# Patient Record
Sex: FEMALE | Race: WHITE | Employment: PART TIME | URBAN - METROPOLITAN AREA
[De-identification: names, ages, dates, MRNs, and addresses within clinical notes are randomized per-mention and may not be internally consistent; named-entity substitution may affect disease eponyms.]

---

## 2018-10-24 NOTE — PROGRESS NOTES
PHYSICAL THERAPY - DAILY TREATMENT NOTE    Patient Name: Nolvia Allison        Date: 10/25/2018  : 1955   YES Patient  Verified  Visit #:     Insurance: Payor: Zakiya Amezcua / Plan:  [address] [facility] / Product Type: Commerical /      In time: 7:42 am Out time: 8:41am   Total Treatment Time: 61     BCBS and Medicare Time Tracking (below)   Total Timed Codes (min):  n/a 1:1 Treatment Time:  n/a     TREATMENT AREA =  Low back pain [M54.5]  SUBJECTIVE  Pain Level (on 0 to 10 scale):  1-2  / 10   Medication Changes/New allergies or changes in medical history, any new surgeries or procedures? NO    If yes, update Summary List   Subjective Functional Status/Changes:  []  No changes reported     See POC       OBJECTIVE    10 min Therapeutic Exercise:  [x]  See flow sheet   [x]  Other:   Education anatomy and physiology of the lumbar spine. [x]  Added:  RFIS - incr  R L/S sx - recommended trail pre and post amb to mail box, education for D/C if RFIS cont incr L/S sx or peripheralize to DACIA LEs, however, cont of RFIS if incr amb tolerance    to improve (function):    []  Changed:     Rationale: To increase ROM, flexibility, and increase strength to improve the patients ability to tolerate prolonged ambulation      min Patient Education:  YES  Reviewed HEP   []  Progressed/Changed HEP based on: Other Objective/Functional Measures:   Physical Therapy Evaluation - Lumbar Spine ([facility])    SUBJECTIVE  Aggravated by:   [] Bending [] Sitting [x] Standing [x] Walking   [] Moving [] Cough [] Sneeze [] Valsalva   [] AM  [] PM  Lying:  [] sup   [] pro   [] sidelying     Eased by:    [] Bending [x] Sitting [] Standing [] Walking   [] Moving [] AM  [] PM  Lying: [] sup  [] pro  [] sidelying   [] Other:     General Health:  Red Flags Indicated? [x] Yes    [] No  [] Yes [x] No Recent weight change (If yes, due to dieting?  [] Yes  [x] No)   [x] Yes [] No Weakness in legs during walking - occassional with prolonged ambulation   [] Yes [x] No Unremitting pain at night  [] Yes [x] No Abdominal pain or problems  [] Yes [x] No Rectal bleeding  [] Yes [x] No Blood or pain with urination  [x] Yes [] No Dysfunction of bowel or bladder - constipation secondary to change in HTN medication   [] Yes [x] No Numbness/tingling in buttock/genitalia region   [] Yes [x] No   Fever or Chills   [] Yes [x] No   Ataxic Gait    Past History/Treatments:     Diagnostic Tests: [] Lab work [] X-rays    [] CT [x] MRI     [] Other:  Results: L/S stenosis    OBJECTIVE  Posture:  Lateral Shift: [] R    [] L     [] +  [x] -  Kyphosis: [] Increased [] Decreased   [x]  WNL  Lordosis:  [] Increased [x] Decreased   [] WNL    Gait:  [] Normal     [x] Abnormal:    Active Movements: [] N/A   [] Too acute   [] Other:  ROM % AROM % PROM Comments:pain, area   Forward flexion 40-60 85% Lower tibia Central L/S pull   Extension 20-30 50%     SB right 20-30 85%     SB left 20-30 85%  L buttock cramping, L L/S pain with ascending    Rotation right 5-10 100%     Rotation left 5-10 100%  L L/S pain   Side glide right 75%     Side glide left 85%  L L/S pain      Neuro Screen [x] WNL Dermatome: L2-S2 DACIA LE light touch sensation WNLs  R/L Patellar DTR (L4): 1+/1+   R/L Achilles DTR (S1): 0/0    Palpation  [x] Min  [] Mod  [] Severe    Location: DACIA R>L L/S Paraspinals    [x]WNL Location: Quadratus Lumborum  [x] Min  [] Mod  [] Severe    Location: DACIA R>LPiriformis    Strength   L(0-5) R (0-5) N/T   Psoas  (L1,2) 4+ 4+    []   Quadriceps (L3,4) 5- 5 []   Ant Tibialis (L4) 4+ 5 []   Extensor Hallicus (L5) 5- 4-    Gluteus Medius (L5) 3 2+ []   Gastrocnemius (S1, S2) 2 2 []   Hamstring (S1,2) 5- 5- []   Gluteus Holland (S1, S2) 4- 3+ []   Supine Bridge 25%  []     Special Tests    [place]:  Distraction: [] R    [] L    [] +    [x] -     Compression: [] +    [x] -      Thigh Thrust: [] +    [x] -                            Sacral Thrust: [] +    [x] -      Leg Length: [x] +    [] -   Position: (+R ANT L POST)     Mobility: Standing flex: (=)     Supine to sit: (N/T)         Hip: [place]:  [] R    [] L    [] +    [x] -     Piriformis: [] R    [] L    [] +    [x] -          Deficits: Ramses's: [] R    [] L    [] +    [x] -     Hamstrings 90/90: R170/L167 deg    Dural Mobility:  SLR Supine: [] R    [] L    [] +    [x] -  @ (degrees):   Slump Test: [] R    [] L    [] +    [x] -  @ (degrees):     Justification for Eval Complexity:   Patient History: HIGH Complexity :3+ comorbidities / personal factors will impact the outcome/ POC  (See POC for list of comorbidities)  Examination:HIGH Complexity : 4+ Standardized tests and measures addressing body structure, function, activity limitation and / or participation in recreation  (See POC and musculoskeletal examination attached)  Clinical Presentation: MEDIUM Complexity : Evolving with changing characteristics  (pain level 1-2/10 on average and 6/10 @ worst, DACIA LE pain, intermittent pain)  Clinical Decision Making:MEDIUM Complexity : FOTO score of 26-74 (Foto 45/100)  Overall Complexity:MEDIUM     Post Treatment Pain Level (on 0 to 10) scale:   1  / 10     ASSESSMENT  Assessment/Changes in Function:     See POC     []  See Progress Note/Recertification   Patient will continue to benefit from skilled PT services to modify and progress therapeutic interventions, address functional mobility deficits, address ROM deficits, address strength deficits, analyze and address soft tissue restrictions, analyze and cue movement patterns, analyze and modify body mechanics/ergonomics and assess and modify postural abnormalities to attain remaining goals.    Progress toward goals / Updated goals:    See POC     PLAN  [x]  Upgrade activities as tolerated YES Continue plan of care   []  Discharge due to :    []  Other:      Therapist: Cristine Galvan DPT     Date: 10/25/2018 Time: 3:25 PM     Future Appointments   Date Time Provider Liliana Parks 10/25/2018  7:30 AM Evangelina [facility]

## 2018-10-25 ENCOUNTER — HOSPITAL ENCOUNTER (OUTPATIENT)
Dept: PHYSICAL THERAPY | Age: 63
Discharge: HOME | End: 2018-10-25
Payer: COMMERCIAL

## 2018-10-25 PROCEDURE — 97110 THERAPEUTIC EXERCISES: CPT

## 2018-10-25 PROCEDURE — 97162 PT EVAL MOD COMPLEX 30 MIN: CPT

## 2018-10-31 ENCOUNTER — HOSPITAL ENCOUNTER (OUTPATIENT)
Dept: PHYSICAL THERAPY | Age: 63
Discharge: HOME | End: 2018-10-31
Payer: COMMERCIAL

## 2018-10-31 PROCEDURE — 97140 MANUAL THERAPY 1/> REGIONS: CPT

## 2018-10-31 PROCEDURE — 97110 THERAPEUTIC EXERCISES: CPT

## 2018-10-31 NOTE — PROGRESS NOTES
PHYSICAL THERAPY - DAILY TREATMENT NOTE    Patient Name: Jose Angel Freedman        Date: 10/31/2018  : 1955   YES Patient  Verified  Visit #:     Insurance: Payor: Dilip Dos Santos / Plan:  [address] [facility] / Product Type: Commerical /      In time: 7:36 am Out time: 8:34am   Total Treatment Time: 62     BCBS and Medicare Time Tracking (below)   Total Timed Codes (min):  n/a 1:1 Treatment Time:  N/a     TREATMENT AREA =  Spinal stenosis, lumbar region with neurogenic claudication [M48.062]  Low back pain [M54.5]  SUBJECTIVE  Pain Level (on 0 to 10 scale):  1.5  / 10   Medication Changes/New allergies or changes in medical history, any new surgeries or procedures? NO    If yes, update Summary List   Subjective Functional Status/Changes:  []  No changes reported     Pt states \"right above my baseline I feel a little pinch, but its only while im down and as soon as I'm back up its gone, the pain is at the belt line and feels like a pulling going up the back, improved the\"         OBJECTIVE    Modality Modalities Rationale: decrease inflammation and decrease pain to improve patient's ability to perform ADLs. min [] Estim, type:                                          []  att     []  unatt     []  w/US     []  w/ice    []  w/heat    min []  Mechanical Traction: type/lbs                                               []  pro   []  sup   []  int   []  cont    min []  Ultrasound, settings/location:      min []  Iontophoresis:  []  take home patch w/ dexamethazone    min                                []  in clinic w/ dexamethazone   5 min []  Ice     [x]  Heat     position: L/S supine with wedge    min []  Other:      40 min Therapeutic Exercise:  [x]  See flow sheet   []  Other:      [x]  Added:  RFIS, DKTC, PPT, and hamstring stretch   to improve (function):    []  Changed:     Rationale:  To increase ROM, flexibility, and increase strength to improve the patients ability to perform prolonged ambulation to grocery shop    13 min Manual Therapy: STM to DACIA lower T/S and upper L/S paraspinals, trigger point release to DACIA piriformis L>R   Rationale:      decrease pain, increase ROM, increase tissue extensibility and decrease trigger points in L/S to improve patient's ability to tolerate prolonged standing. min Patient Education:  YES  Reviewed HEP   []  Progressed/Changed HEP based on: Other Objective/Functional Measures: Added RFIS, DKTC, PPT, and hamstring stretch with good pt tolerance and no complaints of sx. Post Treatment Pain Level (on 0 to 10) scale:   0  / 10     ASSESSMENT  Assessment/Changes in Function:     Pt presented with increased TTP and mm tone in L piriformis and DACIA L/S and lower T/S musculature. VCing for proper form with therex t/o tx program. Updated and issued L/S stenosis HEP. []  See Progress Note/Recertification   Patient will continue to benefit from skilled PT services to modify and progress therapeutic interventions, address functional mobility deficits, address ROM deficits, address strength deficits, analyze and address soft tissue restrictions, analyze and cue movement patterns, analyze and modify body mechanics/ergonomics and assess and modify postural abnormalities to attain remaining goals. Progress toward goals / Updated goals:    First visit after initial evaluation. Progress tx per POC.         PLAN  [x]  Upgrade activities as tolerated YES Continue plan of care   []  Discharge due to :    []  Other:      Therapist: Juan Lima DPT     Date: 10/31/2018 Time: 7:04 AM     Future Appointments   Date Time Provider Liliana Parks   10/31/2018  7:30 AM [facility]   11/2/2018  3:30 PM Davidson Jain PTA [facility]   11/6/2018  4:30 PM Davidson Jain PTA [facility]   11/8/2018  4:30 PM Davidson Jain PTA [facility]   11/12/2018  4:30 PM [facility]   11/15/2018  4:30 PM Davidson Jain PTA [facility]   11/19/2018  4:30 PM [facility]   11/21/2018  4:30 PM [facility]   11/26/2018  4:30 PM [facility]   11/29/2018  4:30 PM Migue Caldera PTA [facility]   12/3/2018  4:30 PM [facility]

## 2018-11-02 ENCOUNTER — HOSPITAL ENCOUNTER (OUTPATIENT)
Dept: PHYSICAL THERAPY | Age: 63
Discharge: HOME | End: 2018-11-02
Payer: COMMERCIAL

## 2018-11-02 PROCEDURE — 97140 MANUAL THERAPY 1/> REGIONS: CPT

## 2018-11-02 PROCEDURE — 97110 THERAPEUTIC EXERCISES: CPT

## 2018-11-02 NOTE — PROGRESS NOTES
PHYSICAL THERAPY - DAILY TREATMENT NOTE    Patient Name: Edith Pagan        Date: 2018  : 1955   YES Patient  Verified  Visit #:   3   of   12  Insurance: Payor: Alanis Fuentes / [facility]:  [facility] / Product Type: Commerical /      In time: 3:25 pm Out time: 4:19 pm   Total Treatment Time: 54     Medicare Time Tracking (below)   Total Timed Codes (min):  n/a 1:1 Treatment Time:  n/a     TREATMENT AREA =  Spinal stenosis, lumbar region with neurogenic claudication [M48.062]  Low back pain [M54.5]    SUBJECTIVE  Pain Level (on 0 to 10 scale):  0  / 10-took 2 ibuprofin at 12pm   Medication Changes/New allergies or changes in medical history, any new surgeries or procedures? NO    If yes, update Summary List   Subjective Functional Status/Changes:  []  No changes reported     I have started doing the exercise and it seems like all the muscles and spine are resetting itself. Pt reports doing HEP pain in knees, upper thighs, upper back and in shoulder joints. Walking is bothering her.    Doing HEP 4.5 to 5x per day  Pt reports daily low grade HA      OBJECTIVE  Modalities Rationale:     decrease edema, decrease inflammation, decrease pain and increase tissue extensibility to improve patient's ability to perform prolonged standing , walking   min [] Estim, type/location:                                      []  att     []  unatt     []  w/US     []  w/ice    []  w/heat    min []  Mechanical Traction: type/lbs                   []  pro   []  sup   []  int   []  cont    []  before manual    []  after manual    min []  Ultrasound, settings/location:      min []  Iontophoresis w/ dexamethasone, location:                                               []  take home patch       []  in clinic   10 min []  Ice     [x]  Heat    location/position: Supine low back    min []  Vasopneumatic Device, press/temp:     min []  Other:    [x] Skin assessment post-treatment (if applicable):    [x]  intact    []  redness- no adverse reaction     []redness - adverse reaction:        34 min Therapeutic Exercise:  [x]  See flow sheet   Rationale:      increase ROM and increase strength to improve the patients ability to perform prolonged standing , walking      10 min Manual Therapy: Prone bilateral pirformis release L>R; STM/DTM to lumbar paraspinals, lower thoracic paraspinals   Rationale:      decrease pain, increase ROM, increase tissue extensibility and decrease trigger points to improve patient's ability to improve tissue mobility in ADLs       min Patient Education:  YES  Reviewed HEP   []  Progressed/Changed HEP based on: Other Objective/Functional Measures:    Hold DKTC- add SKTC, modified hamstring stretch to sitting     Post Treatment Pain Level (on 0 to 10) scale:   0*  / 10     ASSESSMENT  Assessment/Changes in Function:   Pt education to perform HEP within tolerable ranges. VCs for proper stretching technique in forward trunk flexion. Trigger point tenderness right > Left lumbar paraspoinals and left > Right piriformis. []  See Progress Note/Recertification   Patient will continue to benefit from skilled PT services to modify and progress therapeutic interventions, address functional mobility deficits, address ROM deficits, analyze and cue movement patterns, analyze and modify body mechanics/ergonomics and instruct in home and community integration to attain remaining goals. Progress toward goals / Updated goals: · Short Term Goals: To be accomplished in 3  weeks:  1) Establish HEP. -goal in progress  2) Patient will report decreased c/o pain to < or = 5/10 at the worst to facilitate prolonged standing with manageable sx in L/S.  3) Patient will report 25% improvement in ability to transfer groceries from the shopping cart into car.   4) Patient will report 25% improvement in DACIA LE radicular symptoms in order to facilitate ease with prolonged sitting.             PLAN  []  Upgrade activities as tolerated YES Continue plan of care   []  Discharge due to :    []  Other:      Therapist: Neyda Araiza PTA    Date: 11/2/2018 Time: 4: 19  PM     Future Appointments   Date Time Provider Liliana Parks   11/2/2018  3:30 PM Concepcion Lesch, PTA [facility]   11/6/2018  4:30 PM Concepcion Lesch, [facility]   11/8/2018  4:30 PM Concepcion Lesch, PTA [facility]   11/12/2018  4:30 PM Brook Copping [facility]   11/15/2018  4:30 PM Concepcion Lesch, [facility]   11/19/2018  4:30 PM Brook Copping [facility]   11/21/2018  4:30 PM Brook Copping [facility]   11/26/2018  4:30 PM Brook Copping [facility]   11/29/2018  4:30 PM Concepcion Lesch, [facility]   12/3/2018  4:30 PM Brook Copping [facility]

## 2018-11-05 ENCOUNTER — HOSPITAL ENCOUNTER (OUTPATIENT)
Dept: PHYSICAL THERAPY | Age: 63
Discharge: HOME | End: 2018-11-05
Payer: COMMERCIAL

## 2018-11-05 PROCEDURE — 97110 THERAPEUTIC EXERCISES: CPT

## 2018-11-05 PROCEDURE — 97140 MANUAL THERAPY 1/> REGIONS: CPT

## 2018-11-05 NOTE — PROGRESS NOTES
PHYSICAL THERAPY - DAILY TREATMENT NOTE    Patient Name: Reggie Smith        Date: 2018  : 1955   YES Patient  Verified  Visit #:     Insurance: Payor: Katharina Marte / [facility]:  [facility] [address] [facility] [address] [facility] [address] [facility] / Product Type: Commerical /      In time: 505 pm Out time: 611 pm   Total Treatment Time: 66     Medicare Time Tracking (below)   Total Timed Codes (min):  n/a 1:1 Treatment Time:  n/a     TREATMENT AREA =  Spinal stenosis, lumbar region with neurogenic claudication [M48.062]  Low back pain [M54.5]    SUBJECTIVE  Pain Level (on 0 to 10 scale): 1.5/10   Medication Changes/New allergies or changes in medical history, any new surgeries or procedures? NO    If yes, update Summary List   Subjective Functional Status/Changes:  []  No changes reported     I dont have any HA today and I feel like the core exercises are so important. Id like to work on increasing my walking tolerance as I can only walk < 25yards before my low back hurts and gets tired. Im a bit sore bc I was working at another desk today.      OBJECTIVE  Modalities Rationale:     decrease edema, decrease inflammation, decrease pain and increase tissue extensibility to improve patient's ability to perform prolonged standing , walking   min [] Estim, type/location:                                      []  att     []  unatt     []  w/US     []  w/ice    []  w/heat    min []  Mechanical Traction: type/lbs                   []  pro   []  sup   []  int   []  cont    []  before manual    []  after manual    min []  Ultrasound, settings/location:      min []  Iontophoresis w/ dexamethasone, location:                                               []  take home patch       []  in clinic   10 min []  Ice     [x]  Heat    location/position: Prone with LEs over pillow low back    min []  Vasopneumatic Device, press/temp:     min []  Other:    [x] Skin assessment post-treatment (if applicable):    [x]  intact    []  redness- no adverse reaction []redness - adverse reaction:        45 min Therapeutic Exercise:  [x]  See flow sheet   Rationale:      increase ROM and increase strength to improve the patients ability to perform prolonged standing , walking      11 min Manual Therapy: Sacral decompression and TPR left piriformis   Rationale:      decrease pain, increase ROM, increase tissue extensibility and decrease trigger points to improve patient's ability to improve tissue mobility in ADLs       min Patient Education:  YES  Reviewed HEP   []  Progressed/Changed HEP based on: Other Objective/Functional Measures:  + ASLR LLE 0-35 degrees   + BLE dependent rubor   Post Treatment Pain Level (on 0 to 10) scale:   1*  / 10     ASSESSMENT  Assessment/Changes in Function:   Pt education to perform HEP to tolerance. She was educated on importance of avoiding crossing the RLE over the LLE when sitting. Postural re-ed provided throughout session. She required manual cueing to assist with TA contraction for all core therex. Pt instructed to begin short daily bouts of ambulation and to increase frequency of such bouts instead of duration. She was instructed to time herself and get a baseline as to how long she can ambulate before she must sit down.     []  See Progress Note/Recertification   Patient will continue to benefit from skilled PT services to modify and progress therapeutic interventions, address functional mobility deficits, address ROM deficits, analyze and cue movement patterns, analyze and modify body mechanics/ergonomics and instruct in home and community integration to attain remaining goals. Progress toward goals / Updated goals: · Short Term Goals: To be accomplished in 3  weeks:  1) Establish HEP.   -goal in progress  2) Patient will report decreased c/o pain to < or = 5/10 at the worst to facilitate prolonged standing with manageable sx in L/S.-PROGRESSING 1.5/10 11/5/18  3) Patient will report 25% improvement in ability to transfer groceries from the shopping cart into car.   4) Patient will report 25% improvement in DACIA LE radicular symptoms in order to facilitate ease with prolonged sitting.             PLAN  []  Upgrade activities as tolerated YES Continue plan of care   []  Discharge due to :    []  Other:      Therapist: Mahsa Liriano PT    Date: 11/5/2018 Time: 4: 19  PM     Future Appointments   Date Time Provider Liliana Parks   11/8/2018  4:30 PM Cary Hameed [place] [id number]   11/12/2018  4:30 PM Deepika Phy DMCPTA [facility] [address]   11/15/2018  4:30 PM Cary Hameed [place] [id number]   11/19/2018  4:30 PM Deepika Phy DMCPTA [facility] [address]   11/21/2018  4:30 PM Deepika Phy DMCPTA [facility] [address]   11/26/2018  4:30 PM Deepika Phy DMCPTA [facility] [address]   11/29/2018  4:30 PM Cary Hameed [place] [id number]   12/3/2018  4:30 PM Deepika Phy [place] [id number]

## 2018-11-06 ENCOUNTER — APPOINTMENT (OUTPATIENT)
Dept: PHYSICAL THERAPY | Age: 63
End: 2018-11-06
Payer: COMMERCIAL

## 2018-11-08 ENCOUNTER — HOSPITAL ENCOUNTER (OUTPATIENT)
Dept: PHYSICAL THERAPY | Age: 63
Discharge: HOME | End: 2018-11-08
Payer: COMMERCIAL

## 2018-11-08 PROCEDURE — 97140 MANUAL THERAPY 1/> REGIONS: CPT

## 2018-11-08 PROCEDURE — 97110 THERAPEUTIC EXERCISES: CPT

## 2018-11-08 NOTE — PROGRESS NOTES
PHYSICAL THERAPY - DAILY TREATMENT NOTE    Patient Name: Damion Balbuena        Date: 2018  : 1955   YES Patient  Verified  Visit #:     Insurance: Payor: Jodie Vela / Plan:  [employer] [address] PT / Product Type: Commerical /      In time: 4:35 pm Out time: 5:42 pm   Total Treatment Time: 79     Medicare Time Tracking (below)   Total Timed Codes (min):  n/a 1:1 Treatment Time:  n/a     TREATMENT AREA =  Spinal stenosis, lumbar region with neurogenic claudication [M48.062]  Low back pain [M54.5]    SUBJECTIVE  Pain Level (on 0 to 10 scale):  0 / 10   Medication Changes/New allergies or changes in medical history, any new surgeries or procedures? NO    If yes, update Summary List   Subjective Functional Status/Changes:  []  No changes reported     Pt states doing a lot of walking on Tuesday secondary to voting. I went through a wobbly stage when I went threw the door. I walked about 2.5 min until I got to the door. After voting she had to stop twice to stretch to get back to the car. Yesterday an all over feeling of not feeling well. Today she feels better.         OBJECTIVE  Modalities Rationale:     decrease edema, decrease inflammation, decrease pain and increase tissue extensibility to improve patient's ability to perform prolonged walking   min [] Estim, type/location:                                      []  att     []  unatt     []  w/US     []  w/ice    []  w/heat    min []  Mechanical Traction: type/lbs                   []  pro   []  sup   []  int   []  cont    []  before manual    []  after manual    min []  Ultrasound, settings/location:      min []  Iontophoresis w/ dexamethasone, location:                                               []  take home patch       []  in clinic   10 min []  Ice     [x]  Heat    location/position: Supine LE elevated    min []  Vasopneumatic Device, press/temp:     min []  Other:    [x] Skin assessment post-treatment (if applicable):    [x]  intact []  redness- no adverse reaction     []redness - adverse reaction:        47 min Therapeutic Exercise:  [x]  See flow sheet   Rationale:      increase ROM and increase strength to improve the patients ability to perform prolonged walking     10 min Manual Therapy: Prone bilateral pirformis release L>R; STM/DTM to lumbar paraspinals, lower thoracic paraspinals   Rationale:      decrease pain, increase ROM, increase tissue extensibility and decrease trigger points to improve patient's ability to improve tissue mobility in ADLs       min Patient Education:  YES  Reviewed HEP   []  Progressed/Changed HEP based on: Other Objective/Functional Measures:  Advanced gentle core stabilization: add supine march with abdominal draw  Review HEP. Post Treatment Pain Level (on 0 to 10) scale:  0  / 10     ASSESSMENT  Assessment/Changes in Function:   Modified supine posterior hip stretch to 90/90 with good tolerance. Pt demonstrating improving AROM and tolerance to stretching with SKTC and decreased ROM in trunk FF stretch. Reviewed the importance of short frequent walks versus prolonged walking and stretching within tolerable ranges in HEP. Pt education in use of moist heat in supine decompression position and use of flexion stretches for pain management in activities. Pt demonstrating improving mobility in position changes from supine to sit and bed mobility. []  See Progress Note/Recertification   Patient will continue to benefit from skilled PT services to modify and progress therapeutic interventions, address functional mobility deficits, address ROM deficits, address strength deficits, analyze and address soft tissue restrictions, analyze and cue movement patterns, analyze and modify body mechanics/ergonomics and instruct in home and community integration to attain remaining goals.    Progress toward goals / Updated goals:  1) Establish HEP.  -goal in progress  2) Patient will report decreased c/o pain to < or = 5/10 at the worst to facilitate prolonged standing with manageable sx in L/S.-PROGRESSING 1.5/10 11/5/18  3) Patient will report 25% improvement in ability to transfer groceries from the shopping cart into car.   4) Patient will report 25% improvement in DACIA LE radicular symptoms in order to facilitate ease with prolonged sitting- goal in progress     PLAN  []  Upgrade activities as tolerated YES Continue plan of care   []  Discharge due to :    []  Other:      Therapist: Jolynn Lopez PTA    Date: 11/8/2018 Time: 5:42  PM     Future Appointments   Date Time Provider Liliana Parks   11/12/2018  4:30 PM Prasad Mcbride [facility]   11/15/2018  4:30 PM Leo Musa PTA [facility]   11/19/2018  4:30 PM Prasad [facility]   11/21/2018  4:30 PM [facility]   11/26/2018  4:30 PM Prasad [facility]   11/29/2018  4:30 PM Leo Musa PTA [facility]   12/3/2018  4:30 PM Alfredo Leggett

## 2018-11-12 ENCOUNTER — HOSPITAL ENCOUNTER (OUTPATIENT)
Dept: PHYSICAL THERAPY | Age: 63
Discharge: HOME | End: 2018-11-12
Payer: COMMERCIAL

## 2018-11-12 PROCEDURE — 97110 THERAPEUTIC EXERCISES: CPT

## 2018-11-12 PROCEDURE — 97140 MANUAL THERAPY 1/> REGIONS: CPT

## 2018-11-12 NOTE — PROGRESS NOTES
PHYSICAL THERAPY - DAILY TREATMENT NOTE    Patient Name: Jolanta Wren        Date: 2018  : 1955   YES Patient  Verified  Visit #:     Insurance: Payor: Gaston Holder / Plan: [address] PT / Product Type: Commerical /      In time: 4:18pm Out time: 5:32pm   Total Treatment Time: 74     Medicare Time Tracking (below)   Total Timed Codes (min):  n/a 1:1 Treatment Time:  n/a     TREATMENT AREA =  Spinal stenosis, lumbar region with neurogenic claudication [M48.062]  Low back pain [M54.5]    SUBJECTIVE  Pain Level (on 0 to 10 scale):  1  / 10   Medication Changes/New allergies or changes in medical history, any new surgeries or procedures? NO    If yes, update Summary List   Subjective Functional Status/Changes:  []  No changes reported     If I sit too long it gets stiff, I was off today so its sore, I did a lot of yard work. OBJECTIVE  Modalities Rationale:     decrease inflammation and decrease pain to improve patient's ability to perform ADLs. min [] Estim, type/location:                                      []  att     []  unatt     []  w/US     []  w/ice    []  w/heat    min []  Mechanical Traction: type/lbs                   []  pro   []  sup   []  int   []  cont    []  before manual    []  after manual    min []  Ultrasound, settings/location:      min []  Iontophoresis w/ dexamethasone, location:                                               []  take home patch       []  in clinic   10 min []  Ice     [x]  Heat    location/position: L/S    min []  Vasopneumatic Device, press/temp:     min []  Other:    [x] Skin assessment post-treatment (if applicable):    [x]  intact    []  redness- no adverse reaction     []redness - adverse reaction:        53 min Therapeutic Exercise:  [x]  See flow sheet   Rationale:      increase ROM and increase strength to improve the patients ability to perform household chores.       11 min Manual Therapy: STM to DACIA lower T/S and upper L/S paraspinals, trigger point release to DACIA piriformis L>R   Rationale:      decrease pain, increase ROM, increase tissue extensibility and decrease trigger points to improve patient's ability to tolerate prolonged standing. min Patient Education:  YES  Reviewed HEP   []  Progressed/Changed HEP based on: Other Objective/Functional Measures: Added TFL stretch     Post Treatment Pain Level (on 0 to 10) scale:   0  / 10     ASSESSMENT  Assessment/Changes in Function:     Progressed DACIA LE flexibility by progressing piriformis to figure 4 stretch and adding TFL stretch. Demonstrated increased DACIA piriformis tone and tenderness. Progressed therex as appropriate. []  See Progress Note/Recertification   Patient will continue to benefit from skilled PT services to modify and progress therapeutic interventions, address functional mobility deficits, address ROM deficits, address strength deficits, analyze and address soft tissue restrictions, analyze and cue movement patterns, analyze and modify body mechanics/ergonomics, assess and modify postural abnormalities, address imbalance/dizziness and instruct in home and community integration to attain remaining goals. Progress toward goals / Updated goals:    Progressing towards STGs 1-4.   1) Establish HEP. 2) Patient will report decreased c/o pain to < or = 5/10 at the worst to facilitate prolonged standing with manageable sx in L/S. Goal in progress - 1/10 pain at the worst  3) Patient will report 25% improvement in ability to transfer groceries from the shopping cart into car. Goal Met - >/=25% \"It went ok I had to be real careful, it immediately went away when I sat down, it is still an activity I have to be careful with because it will pull\"  4) Patient will report 25% improvement in DACIA LE radicular symptoms in order to facilitate ease with prolonged sitting.         PLAN  [x]  Upgrade activities as tolerated YES Continue plan of care   [] Discharge due to :    []  Other:      Therapist: aMtt Chapman    Date: 11/12/2018 Time: 2:56 PM     Future Appointments   Date Time Provider Liliana Parks   11/12/2018  4:30 PM Leo [facility]   11/15/2018  4:30 PM Peggy Bender PTA [facility]   11/19/2018  4:30 PM Leo [facility]   11/21/2018  4:30 PM Leo [facility]   11/26/2018  4:30 PM Leo [facility]   11/29/2018  4:30 PM Peggy Bender PTA [facility]   12/3/2018  4:30 PM Leo [facility]

## 2018-11-15 ENCOUNTER — HOSPITAL ENCOUNTER (OUTPATIENT)
Dept: PHYSICAL THERAPY | Age: 63
Discharge: HOME | End: 2018-11-15
Payer: COMMERCIAL

## 2018-11-15 PROCEDURE — 97140 MANUAL THERAPY 1/> REGIONS: CPT

## 2018-11-15 PROCEDURE — 97110 THERAPEUTIC EXERCISES: CPT

## 2018-11-15 NOTE — PROGRESS NOTES
PHYSICAL THERAPY - DAILY TREATMENT NOTE    Patient Name: Lavonne Obrien        Date: 11/15/2018  : 1955   YES Patient  Verified  Visit #:   Insurance: Payor: Fran Porter / Plan:  [address] [facility] / Product Type: Commerical /      In time: 4:17pm Out time: 5:25 pm   Total Treatment Time: 68     Medicare Time Tracking (below)   Total Timed Codes (min):  n/a 1:1 Treatment Time:  n/a     TREATMENT AREA =  Spinal stenosis, lumbar region with neurogenic claudication [M48.062]  Low back pain [M54.5]    SUBJECTIVE  Pain Level (on 0 to 10 scale): 0  / 10   Medication Changes/New allergies or changes in medical history, any new surgeries or procedures? NO    If yes, update Summary List   Subjective Functional Status/Changes:  []  No changes reported     Pt states \"I had a meeting at the scope yesterday. I had to stop times to stretch. I had to park at one end and walk all the way up . Other than that the exercises are serving me very well. I'm feeling a difference. I'm beginning to feel a firmness in my core and a lightness in my step as long as I don't over do it. \"         OBJECTIVE  Modalities Rationale:     decrease inflammation and decrease pain to improve patient's ability to perform ADLs.      min [] Estim, type/location:                                      []  att     []  unatt     []  w/US     []  w/ice    []  w/heat    min []  Mechanical Traction: type/lbs                   []  pro   []  sup   []  int   []  cont    []  before manual    []  after manual    min []  Ultrasound, settings/location:      min []  Iontophoresis w/ dexamethasone, location:                                               []  take home patch       []  in clinic   10 min []  Ice     [x]  Heat    location/position: L/S    min []  Vasopneumatic Device, press/temp:     min []  Other:    [x] Skin assessment post-treatment (if applicable):    [x]  intact    []  redness- no adverse reaction     []redness - adverse reaction: 48 min Therapeutic Exercise:  [x]  See flow sheet (33 min billed)    Rationale:      increase ROM and increase strength to improve the patients ability to perform household chores. 10 min Manual Therapy: STM to DACIA lower T/S and upper L/S paraspinals, trigger point release to DACIA piriformis L>R   Rationale:      decrease pain, increase ROM, increase tissue extensibility and decrease trigger points to improve patient's ability to tolerate prolonged standing. min Patient Education:  YES  Reviewed HEP   []  Progressed/Changed HEP based on: Other Objective/Functional Measures:    No exercise progression secondary to pt challenged by current program.    Post Treatment Pain Level (on 0 to 10) scale:   0  / 10     ASSESSMENT  Assessment/Changes in Function:   Pt demonstrating improving lumbar AROM in  flexion . Pt met STG #1 for HEP and partially met STG #4 for decreasing radicular symptoms. Increased mm tone lower lumbar paraspinals. []  See Progress Note/Recertification   Patient will continue to benefit from skilled PT services to modify and progress therapeutic interventions, address functional mobility deficits, address ROM deficits, address strength deficits, analyze and address soft tissue restrictions, analyze and cue movement patterns, analyze and modify body mechanics/ergonomics, assess and modify postural abnormalities, address imbalance/dizziness and instruct in home and community integration to attain remaining goals. Progress toward goals / Updated goals:    Progressing towards STGs 1-4.   1) Establish HEP. -patient met goal    2) Patient will report decreased c/o pain to < or = 5/10 at the worst to facilitate prolonged standing with manageable sx in L/S. Goal in progress - 1/10 pain at the worst  3) Patient will report 25% improvement in ability to transfer groceries from the shopping cart into car.  Goal Met - >/=25%  4) Patient will report 25% improvement in DACIA LE radicular symptoms in order to facilitate ease with prolonged sitting. - no radicular symptoms x 3-4 days.        PLAN  [x]  Upgrade activities as tolerated YES Continue plan of care   []  Discharge due to :    []  Other:      Therapist: Annabella Veloz PTA    Date: 11/15/2018 Time: 5:25  PM     Future Appointments   Date Time Provider Liliana Parks   11/15/2018  4:30 PM Dionicio Bowen PTA [facility]   11/19/2018  4:30 PM [facility]   11/21/2018  4:30 PM [facility]   11/26/2018  4:30 PM [facility]   11/29/2018  4:30 PM Dionicio Bowen PTA [facility]   12/3/2018  4:30 PM [facility]

## 2018-11-19 ENCOUNTER — HOSPITAL ENCOUNTER (OUTPATIENT)
Dept: PHYSICAL THERAPY | Age: 63
Discharge: HOME | End: 2018-11-19
Payer: COMMERCIAL

## 2018-11-19 PROCEDURE — 97140 MANUAL THERAPY 1/> REGIONS: CPT

## 2018-11-19 PROCEDURE — 97110 THERAPEUTIC EXERCISES: CPT

## 2018-11-19 NOTE — PROGRESS NOTES
PHYSICAL THERAPY - DAILY TREATMENT NOTE    Patient Name: Rica Aguiar        Date: 2018  : 1955   YES Patient  Verified  Visit #:   8     Insurance: Payor: Samuel Tena / Plan:  [email or web address] PT / Product Type: Commerical /      In time: 4:25pm Out time: 5:45pm   Total Treatment Time: [de-identified]     BCBS and Medicare Time Tracking (below)   Total Timed Codes (min):  n/a 1:1 Treatment Time:  n/a     TREATMENT AREA =  Spinal stenosis, lumbar region with neurogenic claudication [M48.062]  Low back pain [M54.5]  SUBJECTIVE  Pain Level (on 0 to 10 scale):  0  / 10   Medication Changes/New allergies or changes in medical history, any new surgeries or procedures? NO    If yes, update Summary List   Subjective Functional Status/Changes:  []  No changes reported     Pt states \"I did a little vacuming and im stiff today\"         OBJECTIVE    Modality Modalities Rationale: decrease pain and increase tissue extensibility to improve patient's ability to perform ADLs. min [] Estim, type:                                          []  att     []  unatt     []  w/US     []  w/ice    []  w/heat    min []  Mechanical Traction: type/lbs                                               []  pro   []  sup   []  int   []  cont    min []  Ultrasound, settings/location:      min []  Iontophoresis:  []  take home patch w/ dexamethazone    min                                []  in clinic w/ dexamethazone   10 min []  Ice     [x]  Heat     position: L/S in supine     min []  Other:      60 min Therapeutic Exercise:  [x]  See flow sheet   []  Other:      [x]  Added:  clam   to improve (function):    []  Changed:     Rationale:  To increase ROM, flexibility, and increase strength to improve the patients ability to perform household chores    10 min Manual Therapy: Prone STM to DACIA l/S paraspinals, L QL, trigger point release to L>R piriformis   Rationale:      decrease pain, increase ROM, increase tissue extensibility and decrease trigger points in L/S to improve patient's ability to tolerate prolonged standing. min Patient Education:  YES  Reviewed HEP   []  Progressed/Changed HEP based on: Other Objective/Functional Measures: Added clam with good pt tolerance and no complaints of sx. Post Treatment Pain Level (on 0 to 10) scale:   0  / 10     ASSESSMENT  Assessment/Changes in Function:     Pt presented with increased TTP and mm tone in Left piriformis and DACIA L/S paraspinals. Progressed hip strengthening as appropriate. Improved independence with tx program.      []  See Progress Note/Recertification   Patient will continue to benefit from skilled PT services to modify and progress therapeutic interventions, address functional mobility deficits, address ROM deficits, address strength deficits, analyze and address soft tissue restrictions, analyze and cue movement patterns, analyze and modify body mechanics/ergonomics and assess and modify postural abnormalities to attain remaining goals. Progress toward goals / Updated goals:    Progressing towards all STGs.  PN due NV     PLAN  [x]  Upgrade activities as tolerated YES Continue plan of care   []  Discharge due to :    []  Other:      Therapist: Vianey Posadas DPT     Date: 11/19/2018 Time: 4:25 PM     Future Appointments   Date Time Provider Liliana Parks   11/19/2018  4:30 PM Erick Al [facility]   11/26/2018  4:30 PM Erick [facility]   11/29/2018  4:30 PM Lynsey Tejada PTA [facility]   12/3/2018  4:30 PM Erick Al [facility]

## 2018-11-21 ENCOUNTER — APPOINTMENT (OUTPATIENT)
Dept: PHYSICAL THERAPY | Age: 63
End: 2018-11-21
Payer: COMMERCIAL

## 2018-11-26 ENCOUNTER — HOSPITAL ENCOUNTER (OUTPATIENT)
Dept: PHYSICAL THERAPY | Age: 63
Discharge: HOME | End: 2018-11-26
Payer: COMMERCIAL

## 2018-11-26 PROCEDURE — 97140 MANUAL THERAPY 1/> REGIONS: CPT

## 2018-11-26 PROCEDURE — 97110 THERAPEUTIC EXERCISES: CPT

## 2018-11-26 NOTE — PROGRESS NOTES
[address] [facility] THERAPY  [address] -   Phone: [phone]  Fax: [phone]  [x]  PROGRESS NOTE  []   [facility] SUMMARY  Patient Name: Susan Khan : 1955   Treatment Diagnosis: Spinal stenosis, lumbar region with neurogenic claudication [M48.062]  Low back pain [M54.5]     Referral Source: Ace Riddle DO     Date of Initial Visit: 10/25/2018 Attended Visits: 9 Missed Visits: 1     SUMMARY OF TREATMENT  Therapeutic exercises including ROM, strengthening, stretching, manual therapy including joint and soft tissue manipulation, core strengthening, postural re-education, modalities: MHP, and HEP instruction. CURRENT STATUS  The pt has progressed well with therapy, consistently reporting decreased pain and increased functional ability. Currently, the patient's main complaint is LBP with prolonged standing and ambulation. Currently, pt reports 25% improvement in back sx since IE. Average lumbar pain is rated at <1/10 and 1/10 at the worst. Trunk AROM is as follows: flexion 100%, extension 25%, R/L Side bending 100%/100%, and R/L Rotation 95%/100%. Pt would benefit from continued PT services in order to improve L/S AROM, core strength, LE strength, flexibility, functional mobility and address remaining impairments. Goal/Measure of Progress Goal Met? 1.  Establish HEP to prevent further disability. Status at last Eval: Goal Established Current Status: I with HEP yes   2. Patient will report decreased c/o pain to < or = 5/10 at the worst to facilitate prolonged standing with manageable sx in L/S. Status at last Eval: 6/10 at the worst Current Status: 1/10 at the worst yes   3. Patient will report 25% improvement in ability to transfer groceries from the shopping cart into car. Status at last Eval: Goal Established Current Status: L/S pain with lifting and ambulation with groceries  Progressing    4.   Patient will report 25% improvement in DACIA LE radicular symptoms in order to facilitate ease with prolonged sitting. Status at last Eval: DACIA sides of the L/S and radiates down DACIA LEs (below the knee) and is described as an intermittent ache Current Status: >/=50% improvement in DACIA post LE pain yes     New Goals to be achieved in __3-4__  Weeks:  1. Patient independent with HEP and able to demo proper body mechanics for floor to chest lifting of 20 lbs in order to .   2.   Patient will increase L/S ROM in all directions to pain free and >/=75% to increase ability to perform household chores. 3.  Increase FOTO to 60/100 indicating improved function and quality of life. 4.  Patient to perform 75% bridge indicating improved core strength to improve ambulation around the grocery store. 5.  Patient to increase walking tolerance to >/=10 minutes in order to improve ease with grocery shopping. G-Codes (GP): n/a    RECOMMENDATIONS  Continue therapy with the following recommendations: 1-2x week for 3 weeks    If you have any questions/comments please contact us directly at [phone]   Thank you for allowing us to assist in the care of your patient. Therapist Signature: Renita Lares DPT Date: 11/26/2018     Time: 12:51 PM   NOTE TO PHYSICIAN:  PLEASE COMPLETE THE ORDERS BELOW AND FAX TO   [facility] Physical Therapy: [phone]  If you are unable to process this request in 24 hours please contact our office: [phone]    ___ I have read the above report and request that my patient continue as recommended.   ___ I have read the above report and request that my patient continue therapy with the following changes/special instructions:_________________________________________________________   ___ I have read the above report and request that my patient be discharged from therapy.      Physician Signature:        Date:       Time:

## 2018-11-26 NOTE — PROGRESS NOTES
PHYSICAL THERAPY - DAILY TREATMENT NOTE    Patient Name: China Marin        Date: 2018  : 1955   YES Patient  Verified  Visit #:     Insurance: Payor: Stevenson Whipple / Plan:  [employer] [address] [facility] / Product Type: Commerical /      In time: 4:33 pm Out time: 5:58 pm   Total Treatment Time: 80     BCBS and Medicare Time Tracking (below)   Total Timed Codes (min):  n/a 1:1 Treatment Time:  n/a     TREATMENT AREA =  Spinal stenosis, lumbar region with neurogenic claudication [M48.062]  Low back pain [M54.5]  SUBJECTIVE  Pain Level (on 0 to 10 scale):  0  / 10   Medication Changes/New allergies or changes in medical history, any new surgeries or procedures? NO    If yes, update Summary List   Subjective Functional Status/Changes:  []  No changes reported     Pt states \"he checked in and told him I felt at least a 25% improvement they are teaching me new ways to deal with my issues, he wants to see me in 8 weeks\"       OBJECTIVE    Modality Modalities Rationale: decrease pain and increase tissue extensibility to improve patient's ability to perform ADLs. min [] Estim, type:                                          []  att     []  unatt     []  w/US     []  w/ice    []  w/heat    min []  Mechanical Traction: type/lbs                                               []  pro   []  sup   []  int   []  cont    min []  Ultrasound, settings/location:      min []  Iontophoresis:  []  take home patch w/ dexamethazone    min                                []  in clinic w/ dexamethazone   10 min []  Ice     [x]  Heat     position: L/S in supine     min []  Other:      65 min Therapeutic Exercise:  [x]  See flow sheet   []  Other:      [x]  Added:  Mini squatting with core   to improve (function):    []  Changed:     Rationale:  To increase ROM, flexibility, and increase strength to improve the patients ability to perform household chores    10 min Manual Therapy: Prone STM to DACIA l/S paraspinals, L QL, trigger point release to L>R piriformis   Rationale:      decrease pain, increase ROM, increase tissue extensibility and decrease trigger points in L/S to improve patient's ability to tolerate prolonged standing. min Patient Education:  YES  Reviewed HEP   []  Progressed/Changed HEP based on: Other Objective/Functional Measures:    See PN     Post Treatment Pain Level (on 0 to 10) scale:   0  / 10     ASSESSMENT  Assessment/Changes in Function:     See PN     []  See Progress Note/Recertification   Patient will continue to benefit from skilled PT services to modify and progress therapeutic interventions, address functional mobility deficits, address ROM deficits, address strength deficits, analyze and address soft tissue restrictions, analyze and cue movement patterns, analyze and modify body mechanics/ergonomics and assess and modify postural abnormalities to attain remaining goals.    Progress toward goals / Updated goals:    See PN     PLAN  [x]  Upgrade activities as tolerated YES Continue plan of care   []  Discharge due to :    []  Other:      Therapist: Vianey Posadas DPT     Date: 11/26/2018 Time: 4:25 PM     Future Appointments   Date Time Provider Liliana Parks   11/26/2018  4:30 PM Erick Al [facility]   11/29/2018  4:30 PM Lynsey Tejada PTA [facility]   12/3/2018  4:30 PM Leena Rachel

## 2018-11-29 ENCOUNTER — HOSPITAL ENCOUNTER (OUTPATIENT)
Dept: PHYSICAL THERAPY | Age: 63
Discharge: HOME | End: 2018-11-29
Payer: COMMERCIAL

## 2018-11-29 PROCEDURE — 97110 THERAPEUTIC EXERCISES: CPT

## 2018-11-29 PROCEDURE — 97140 MANUAL THERAPY 1/> REGIONS: CPT

## 2018-11-29 NOTE — PROGRESS NOTES
PHYSICAL THERAPY - DAILY TREATMENT NOTE    Patient Name: China Marin        Date: 2018  : 1955   YES Patient  Verified  Visit #:  (78) 1    of  3-6  Insurance: Payor: Stevenson Whipple / Plan: [address] / Product Type: Commerical /      In time: 4:35 pm Out time: 5: 55 pm   Total Treatment Time: 80     Medicare Time Tracking (below)   Total Timed Codes (min):  n/a 1:1 Treatment Time:  n/a     TREATMENT AREA =  Spinal stenosis, lumbar region with neurogenic claudication [M48.062]  Low back pain [M54.5]    SUBJECTIVE  Pain Level (on 0 to 10 scale): 0  / 10   Medication Changes/New allergies or changes in medical history, any new surgeries or procedures? NO    If yes, update Summary List   Subjective Functional Status/Changes:  []  No changes reported     Very, very little pain. I'm beginning to feel a real difference. I'm beginning to do some things that I haven't done in years. This morning I had to get a 40# bag of dog food off the truck this morning. Walked it from the back of the truck to [employer].  No pain or soreness during          OBJECTIVE  Modalities Rationale:     decrease edema, decrease inflammation, decrease pain and increase tissue extensibility to improve patient's ability to perform functional ADLs   min [] Estim, type/location:                                      []  att     []  unatt     []  w/US     []  w/ice    []  w/heat    min []  Mechanical Traction: type/lbs                   []  pro   []  sup   []  int   []  cont    []  before manual    []  after manual    min []  Ultrasound, settings/location:      min []  Iontophoresis w/ dexamethasone, location:                                               []  take home patch       []  in clinic   10 min []  Ice     []  Heat    location/position:     min []  Vasopneumatic Device, press/temp:     min []  Other:    [x] Skin assessment post-treatment (if applicable):    [x]  intact    []  redness- no adverse reaction     []redness - adverse reaction:        61 min Therapeutic Exercise:  [x]  See flow sheet  (23 min billed)   Rationale:      increase ROM and increase strength to improve the patients ability to perform lifting, functional ADLs     9 min Manual Therapy: Prone trigger point release to right glut medius and piriformis, DTM to lower lumbar paraspinals   Rationale:      decrease pain, increase ROM, increase tissue extensibility and decrease trigger points to improve patient's ability to improve tissue mobility in ADLs     min Patient Education:  YES  Reviewed HEP   []  Progressed/Changed HEP based on: Other Objective/Functional Measures:    Pt education in lifting body mechanics from floor to waist     Post Treatment Pain Level (on 0 to 10) scale:  0 / 10     ASSESSMENT  Assessment/Changes in Function:   Noted increase mm tone in right glut medius and piriformis- improving tissue mobility  after manual release. Pt demonstrating improved knowledge of proper lifting mechanics after instruction in floor to waist lifting technique     []  See Progress Note/Recertification   Patient will continue to benefit from skilled PT services to modify and progress therapeutic interventions, address functional mobility deficits, address ROM deficits, address strength deficits, analyze and address soft tissue restrictions, analyze and cue movement patterns, analyze and modify body mechanics/ergonomics and instruct in home and community integration to attain remaining goals.    Progress toward goals / Updated goals:    Continue toward all current updated goals     PLAN  [x]  Upgrade activities as tolerated yes Continue plan of care   []  Discharge due to :    []  Other:      Therapist: Karina Quevedo PTA    Date: 11/29/2018 Time: 5: 54  PM     Future Appointments   Date Time Provider Liliana Parks   12/3/2018  4:30 PM Leila Lock [facility]   12/6/2018  4:30 PM Lindalou Collet, PTA [facility]   12/10/2018  4:30 PM Leila Lock [facility]   12/13/2018  4:30 PM Liz Padilla PTA [facility]   12/17/2018  4:30 PM Aileen Alexander [facility]

## 2018-12-03 ENCOUNTER — HOSPITAL ENCOUNTER (OUTPATIENT)
Dept: PHYSICAL THERAPY | Age: 63
Discharge: HOME | End: 2018-12-03
Payer: COMMERCIAL

## 2018-12-03 PROCEDURE — 97110 THERAPEUTIC EXERCISES: CPT

## 2018-12-03 PROCEDURE — 97140 MANUAL THERAPY 1/> REGIONS: CPT

## 2018-12-03 NOTE — PROGRESS NOTES
PHYSICAL THERAPY - DAILY TREATMENT NOTE    Patient Name: Iain Beyer        Date: 12/3/2018  : 1955   YES Patient  Verified  Visit #:  (09) 4    of  3-6  Insurance: Payor: Jayy Landry / Plan: [address] / Product Type: Commerical /      In time: 4:25 pm Out time: 5:40 pm   Total Treatment Time: 75     Medicare Time Tracking (below)   Total Timed Codes (min):  n/a 1:1 Treatment Time:  n/a     TREATMENT AREA =  Spinal stenosis, lumbar region with neurogenic claudication [M48.062]  Low back pain [M54.5]    SUBJECTIVE  Pain Level (on 0 to 10 scale): 0 / 10   Medication Changes/New allergies or changes in medical history, any new surgeries or procedures?     NO    If yes, update Summary List   Subjective Functional Status/Changes:  []  No changes reported     \"no pain\"         OBJECTIVE  Modalities Rationale:     decrease edema, decrease inflammation, decrease pain and increase tissue extensibility to improve patient's ability to perform functional ADLs   min [] Estim, type/location:                                      []  att     []  unatt     []  w/US     []  w/ice    []  w/heat    min []  Mechanical Traction: type/lbs                   []  pro   []  sup   []  int   []  cont    []  before manual    []  after manual    min []  Ultrasound, settings/location:      min []  Iontophoresis w/ dexamethasone, location:                                               []  take home patch       []  in clinic   10 min []  Ice     []  Heat    location/position:     min []  Vasopneumatic Device, press/temp:     min []  Other:    [x] Skin assessment post-treatment (if applicable):    [x]  intact    []  redness- no adverse reaction     []redness - adverse reaction:        56 min Therapeutic Exercise:  [x]  See flow sheet  (23 min billed)   Rationale:      increase ROM and increase strength to improve the patients ability to perform lifting, functional ADLs     9 min Manual Therapy: Prone trigger point release to right glut medius and piriformis, DTM to lower lumbar paraspinals   Rationale:      decrease pain, increase ROM, increase tissue extensibility and decrease trigger points to improve patient's ability to improve tissue mobility in ADLs     min Patient Education:  YES  Reviewed HEP   []  Progressed/Changed HEP based on: Other Objective/Functional Measures: Added prone hip extension and standing hip ABD with core     Post Treatment Pain Level (on 0 to 10) scale:  0 / 10     ASSESSMENT  Assessment/Changes in Function:     Demonstrated ~50% hip excursion with supine bridge indicating cont decr abdominal and glute strength. Decreased left prone hip extension 1/2 AROM. Progressed core and lumbar strengthening. []  See Progress Note/Recertification   Patient will continue to benefit from skilled PT services to modify and progress therapeutic interventions, address functional mobility deficits, address ROM deficits, address strength deficits, analyze and address soft tissue restrictions, analyze and cue movement patterns, analyze and modify body mechanics/ergonomics and instruct in home and community integration to attain remaining goals. Progress toward goals / Updated goals:    1. Patient independent with HEP and able to demo proper body mechanics for floor to chest lifting of 20 lbs in order to .   2.   Patient will increase L/S ROM in all directions to pain free and >/=75% to increase ability to perform household chores.     3. Increase FOTO to 60/100 indicating improved function and quality of life. 4.  Patient to perform 75% bridge indicating improved core strength to improve ambulation around the grocery store. Goal in progress - 50% supine bridge   5. Patient to increase walking tolerance to >/=10 minutes in order to improve ease with grocery shopping.          PLAN  [x]  Upgrade activities as tolerated yes Continue plan of care   []  Discharge due to :    []  Other:      Therapist: Chris Veag Ruben Cueva    Date: 12/3/2018 Time: 5: 54  PM     Future Appointments   Date Time Provider Liliana Parks   12/3/2018  4:30 PM Cheikh Mckeon [facility]   12/6/2018  4:30 PM Dusty Galicia PTA [facility]   12/10/2018  4:30 PM Cheikh Mckeon [facility]   12/13/2018  4:30 PM Dusty Galicia PTA [facility]   12/17/2018  4:30 PM Cheikh Mckeon [facility]

## 2018-12-06 ENCOUNTER — HOSPITAL ENCOUNTER (OUTPATIENT)
Dept: PHYSICAL THERAPY | Age: 63
Discharge: HOME | End: 2018-12-06
Payer: COMMERCIAL

## 2018-12-06 PROCEDURE — 97110 THERAPEUTIC EXERCISES: CPT

## 2018-12-06 PROCEDURE — 97112 NEUROMUSCULAR REEDUCATION: CPT

## 2018-12-06 PROCEDURE — 97140 MANUAL THERAPY 1/> REGIONS: CPT

## 2018-12-06 NOTE — PROGRESS NOTES
PHYSICAL THERAPY - DAILY TREATMENT NOTE    Patient Name: Mattie Gitelman        Date: 2018  : 1955   YES Patient  Verified  Visit #:   (99) 4   of  6  Insurance: Payor: Madalyn Gonzalez / Plan: [address] / Product Type: Commerical /      In time: 4:27 Out time: 5:45 pm   Total Treatment Time: 78         TREATMENT AREA =  Spinal stenosis, lumbar region with neurogenic claudication [M48.062]  Low back pain [M54.5]    SUBJECTIVE  Pain Level (on 0 to 10 scale):  1  / 10- low back and left anterior thigh   Medication Changes/New allergies or changes in medical history, any new surgeries or procedures? NO    If yes, update Summary List   Subjective Functional Status/Changes:  []  No changes reported     Pt states  She started having HA and low back pain through the day.  I did feel a kink right in here ( pt pointed to left UT) that started the HA  I timed myself walking and I can do about 3.5 min     OBJECTIVE  Modalities Rationale:     decrease edema, decrease inflammation, decrease pain and increase tissue extensibility to improve patient's ability to improve tissue mobility in ADLs   min [] Estim, type/location:                                      []  att     []  unatt     []  w/US     []  w/ice    []  w/heat    min []  Mechanical Traction: type/lbs                   []  pro   []  sup   []  int   []  cont    []  before manual    []  after manual    min []  Ultrasound, settings/location:      min []  Iontophoresis w/ dexamethasone, location:                                               []  take home patch       []  in clinic   10 min []  Ice     [x]  Heat    location/position: Supine low back    min []  Vasopneumatic Device, press/temp:     min []  Other:    [x] Skin assessment post-treatment (if applicable):    [x]  intact    []  redness- no adverse reaction     []redness - adverse reaction:        35 min Therapeutic Exercise:  [x]  See flow sheet   Rationale:      increase ROM and increase strength to improve the patients ability to perform prolonged sitting     10 min Manual Therapy: Prone trigger point release to right glut medius and piriformis, DTM to lower lumbar paraspinals   Rationale:      decrease pain, increase ROM, increase tissue extensibility and decrease trigger points to improve patient's ability to improve tissue mobility in ADLs      23 min Neuromuscular Re-ed: Pt education in proper body mechanics for sit to stand , supported and unsupported sitting, weight shifting in forward and overhead bending, partial squat body mechanics   Rationale:    increase ROM and increase strength to improve the patients ability to perform ADLs for prolonged sitting, bending, and lifting     min Patient Education:  YES  Reviewed HEP   []  Progressed/Changed HEP based on: Other Objective/Functional Measures: Add treadmill walking, neuromuscular re education in supported and unsupported sitting, lifting and bending body mechanics     Post Treatment Pain Level (on 0 to 10) scale:   0 / 10     ASSESSMENT  Assessment/Changes in Function:   RFIS - abolished left thigh pain. Cervical and low back pain abolished after exercise. Pt demonstrating improving tissue mobility after right piriformis release, improving knowledge of supported and unsupported sitting body mechanics, weight shift, overhead and knee to waist lifting mechanics     []  See Progress Note/Recertification   Patient will continue to benefit from skilled PT services to modify and progress therapeutic interventions, address functional mobility deficits, address ROM deficits, address strength deficits, analyze and address soft tissue restrictions, analyze and modify body mechanics/ergonomics and instruct in home and community integration to attain remaining goals.    Progress toward goals / Updated goals:  .   Patient independent with HEP and able to demo proper body mechanics for floor to chest lifting of 20 lbs in order to .   2.   Patient will increase L/S ROM in all directions to pain free and >/=75% to increase ability to perform household chores.   -partially met   3.  Increase FOTO to 60/100 indicating improved function and quality of life. 4.  Patient to perform 75% bridge indicating improved core strength to improve ambulation around the grocery store. Goal in progress   5.  Patient to increase walking tolerance to >/=10 minutes in order to improve ease with grocery shopping.               PLAN  []  Upgrade activities as tolerated YES Continue plan of care   []  Discharge due to :    []  Other:      Therapist: Maribel Mdaera PTA    Date: 12/6/2018 Time: 5:46 PM     Future Appointments   Date Time Provider Liliana Parks   12/6/2018  4:30 PM Beltran Galarza [facility]   12/10/2018  4:30 PM Harley [facility]   12/13/2018  4:30 PM Vin Newman PTA [facility]   12/17/2018  4:30 PM [facility]

## 2018-12-10 ENCOUNTER — HOSPITAL ENCOUNTER (OUTPATIENT)
Dept: PHYSICAL THERAPY | Age: 63
Discharge: HOME | End: 2018-12-10
Payer: COMMERCIAL

## 2018-12-10 PROCEDURE — 97110 THERAPEUTIC EXERCISES: CPT

## 2018-12-10 PROCEDURE — 97140 MANUAL THERAPY 1/> REGIONS: CPT

## 2018-12-10 NOTE — PROGRESS NOTES
PHYSICAL THERAPY - DAILY TREATMENT NOTE    Patient Name: Lavonne Obrien        Date: 12/10/2018  : 1955   YES Patient  Verified  Visit #:   (78) 6   of   3-6  Insurance: Payor: Fran Porter / Plan: [address] / Product Type: Commerical /      In time: 4:16 pm Out time: 5:35pm   Total Treatment Time: 78     BCBS and Medicare Time Tracking (below)   Total Timed Codes (min):  n/a 1:1 Treatment Time:  n/a     TREATMENT AREA =  Spinal stenosis, lumbar region with neurogenic claudication [M48.062]  Low back pain [M54.5]  SUBJECTIVE  Pain Level (on 0 to 10 scale):  0  / 10   Medication Changes/New allergies or changes in medical history, any new surgeries or procedures? NO    If yes, update Summary List   Subjective Functional Status/Changes:  []  No changes reported     Pt states \"davidson have a psychologically stressful day at work, hitting me in my neck and shoulder blades and back\"       OBJECTIVE    Modality Modalities Rationale: decrease inflammation and decrease pain to improve patient's ability to perform ADLs. min [] Estim, type:                                          []  att     []  unatt     []  w/US     []  w/ice    []  w/heat    min []  Mechanical Traction: type/lbs                                               []  pro   []  sup   []  int   []  cont    min []  Ultrasound, settings/location:      min []  Iontophoresis:  []  take home patch w/ dexamethazone    min                                []  in clinic w/ dexamethazone   10 min []  Ice     [x]  Heat     position: MHP to L/S in prone lying    min []  Other:      58 min Therapeutic Exercise:  [x]  See flow sheet   []  Other:      [x]  Added:  SLR with core   to improve (function):    []  Changed:     Rationale: To increase ROM, flexibility, and increase strength to improve the patients ability to perform household chores.     11 min Manual Therapy: Prone trigger point release to right glut medius and piriformis, DTM to lower lumbar paraspinals   Rationale:      decrease pain, increase ROM, increase tissue extensibility and decrease trigger points in L/S to improve patient's ability to tolerate prolonged standing. min Patient Education:  YES  Reviewed HEP   []  Progressed/Changed HEP based on: Other Objective/Functional Measures: Added SLR with core with good pt tolerance and no complaints of sx. Post Treatment Pain Level (on 0 to 10) scale:   0  / 10     ASSESSMENT  Assessment/Changes in Function:     Pt presented with improving TTP and mm tone in DACIA piriformis and QL. Progressed core strengthening as appropriate. []  See Progress Note/Recertification   Patient will continue to benefit from skilled PT services to modify and progress therapeutic interventions, address functional mobility deficits, address ROM deficits, address strength deficits, analyze and address soft tissue restrictions, analyze and cue movement patterns, analyze and modify body mechanics/ergonomics and assess and modify postural abnormalities to attain remaining goals. Progress toward goals / Updated goals:    Progressing towards all LTGs.       PLAN  [x]  Upgrade activities as tolerated YES Continue plan of care   []  Discharge due to :    []  Other:      Therapist: Daysi Cummings DPT     Date: 12/10/2018 Time: 1:49 PM     Future Appointments   Date Time Provider Liliana Parks   12/10/2018  4:30 PM [facility]   12/13/2018  4:30 PM Cary Hameed PTA [facility]   12/17/2018  4:30 PM [facility]

## 2018-12-13 ENCOUNTER — HOSPITAL ENCOUNTER (OUTPATIENT)
Dept: PHYSICAL THERAPY | Age: 63
Discharge: HOME | End: 2018-12-13
Payer: COMMERCIAL

## 2018-12-13 PROCEDURE — 97140 MANUAL THERAPY 1/> REGIONS: CPT

## 2018-12-13 PROCEDURE — 97110 THERAPEUTIC EXERCISES: CPT

## 2018-12-13 PROCEDURE — 97530 THERAPEUTIC ACTIVITIES: CPT

## 2018-12-14 NOTE — PROGRESS NOTES
PHYSICAL THERAPY - DAILY TREATMENT NOTE    Patient Name: Eva Daniels        Date: 2018  : 1955   YES Patient  Verified  Visit #:  (39) 6  of   6  Insurance: Payor: Stevie Light / Plan: [address] [facility] / Product Type: Commerical /      In time: 4:27 pm Out time: 6:06 pm   Total Treatment Time: 78     Medicare Time /BCBS Tracking (below)   Total Timed Codes (min):  na 1:1 Treatment Time:  na     TREATMENT AREA =  Spinal stenosis, lumbar region with neurogenic claudication [M48.062]  Low back pain [M54.5]    SUBJECTIVE  Pain Level (on 0 to 10 scale): .5  / 10   Medication Changes/New allergies or changes in medical history, any new surgeries or procedures?     NO    If yes, update Summary List   Subjective Functional Status/Changes:  []  No changes reported     Pt reports walking is still limited          OBJECTIVE  Modalities Rationale:     decrease edema, decrease inflammation and decrease pain to improve patient's ability to perform prolonged walking   min [] Estim, type/location:                                      []  att     []  unatt     []  w/US     []  w/ice    []  w/heat    min []  Mechanical Traction: type/lbs                   []  pro   []  sup   []  int   []  cont    []  before manual    []  after manual    min []  Ultrasound, settings/location:      min []  Iontophoresis w/ dexamethasone, location:                                               []  take home patch       []  in clinic   10 min []  Ice     [x]  Heat    location/position: Supine low back     min []  Vasopneumatic Device, press/temp:     min []  Other:    [x] Skin assessment post-treatment (if applicable):    [x]  intact    []  redness- no adverse reaction     []redness - adverse reaction:        58 min Therapeutic Exercise:  [x]  See flow sheet   ( 13 min billed)    Rationale:      increase ROM and increase strength to improve the patients ability to perform functional ADLs     10 min Manual Therapy: Prone trigger point release to right glut medius and piriformis, DTM to lower lumbar paraspinals   Rationale:      decrease pain, increase ROM, increase tissue extensibility and decrease trigger points to improve patient's ability to improve tissue mobility for prolonged walking    9 min Therapeutic Activity: Pt education in lifting body mechanics with 2.5# in crate for ankle to waist and knee to waist lifting, reviewed golfers bend   Rationale:    increase ROM and increase strength to improve the patients ability to perform lifting and bending     min Patient Education:  YES  Reviewed HEP   []  Progressed/Changed HEP based on: Other Objective/Functional Measures: Add lifting body mechanics  FOTO: 56     Post Treatment Pain Level (on 0 to 10) scale:   0  / 10     ASSESSMENT  Assessment/Changes in Function:     See progress note     [x]  See Progress Note/Recertification   Patient will continue to benefit from skilled PT services to modify and progress therapeutic interventions, address functional mobility deficits, address ROM deficits, address strength deficits, analyze and address soft tissue restrictions and instruct in home and community integration to attain remaining goals.    Progress toward goals / Updated goals:    See progress note     PLAN  []  Upgrade activities as tolerated YES Continue plan of care   []  Discharge due to :    []  Other:      Therapist: Yulia Bhatt PTA    Date: 12/13/2018 Time: 6:06  PM     Future Appointments   Date Time Provider Liliana Parks   12/17/2018  4:30 PM Shane Nash

## 2018-12-17 ENCOUNTER — HOSPITAL ENCOUNTER (OUTPATIENT)
Dept: PHYSICAL THERAPY | Age: 63
Discharge: HOME | End: 2018-12-17
Payer: COMMERCIAL

## 2018-12-17 PROCEDURE — 97140 MANUAL THERAPY 1/> REGIONS: CPT

## 2018-12-17 PROCEDURE — 97110 THERAPEUTIC EXERCISES: CPT

## 2018-12-17 NOTE — PROGRESS NOTES
[address] [facility], [address] [place] [address] [facility] [address]  Phone: [phone]  Fax: [phone] SUMMARY  Patient Name: Jefferson Ferguson : 1955   Treatment/Medical Diagnosis: Spinal stenosis, lumbar region with neurogenic claudication [M48.062]  Low back pain [M54.5]   Referral Source: Liang Hyde DO     Date of Initial Visit: 10/25/2018 Attended Visits: 15 Missed Visits: 1     SUMMARY OF TREATMENT  Therapeutic exercises including ROM, strengthening, stretching, manual therapy including joint and soft tissue manipulation, postural re-education, modalities: MHP, HEP instruction. CURRENT STATUS  The pt has progressed well with therapy, consistently reporting decreased pain and increased functional ability. Pt reports 85% improvement in L/S sx since initiating PT services. Based on progress from PT services and pt reported improvement, patient is appropriate for DC to home mgt of sx at this time. Goal/Measure of Progress Goal Met? 1. Patient independent with HEP and able to demo proper body mechanics for floor to chest lifting of 20 lbs in order to . Status at last Eval: Goal Established Current Status: I with HEP yes   2. Patient will increase L/S ROM in all directions to pain free and >/=75% to increase ability to perform household chores.     Status at last Eval: Flexion = 85%  Extension = 50%  R/L Side bending = 85%  R/L Rot = 100% Current Status: Flexion = 100%  Extension = 50%  R/L Side  bending = 100%  R/L Rot = 100% yes   3. Increase FOTO to 60/100 indicating improved function and quality of life. Status at last Eval: FOTO = 45/100 Current Status: FOTO = 56/100 Progressing   4. Patient to perform 75% bridge indicating improved core strength to improve ambulation around the grocery store.    Status at last Eval: Bridge = 25% Current Status: Bridge = 50% Progressing   5.   Patient to increase walking tolerance to >/=10 minutes in order to improve ease with grocery shopping. Status at last Eval: Walking tolerance = ~3 minutes Current Status: Walking tolerance = 4 minutes no     RECOMMENDATIONS  Discontinue therapy. Progressing towards or have reached established goals. If you have any questions/comments please contact us directly at [phone]. Thank you for allowing us to assist in the care of your patient.     Therapist Signature: Becca Barnes Date: 12/17/18     Time: 1:56 PM

## 2018-12-17 NOTE — PROGRESS NOTES
PHYSICAL THERAPY - DAILY TREATMENT NOTE    Patient Name: Marcial Paredes        Date: 2018  : 1955   YES Patient  Verified  Visit #:   15 6   of   6  Insurance: Payor: Colin Jill / Plan: [address] PT / Product Type: Commerical /      In time: 4:18pm Out time: 5:09pm   Total Treatment Time: 46     BCBS and Medicare Time Tracking (below)   Total Timed Codes (min):  41 1:1 Treatment Time:  41     TREATMENT AREA =  Spinal stenosis, lumbar region with neurogenic claudication [M48.062]  Low back pain [M54.5]    SUBJECTIVE  Pain Level (on 0 to 10 scale):  2  / 10   Medication Changes/New allergies or changes in medical history, any new surgeries or procedures? NO    If yes, update Summary List   Subjective Functional Status/Changes:  []  No changes reported     Pt states \"I am not caged up with my dog who had a knee surgery, so spending time with her Im sitting in a differect chair, I have to re-set everything \"       OBJECTIVE  Modality Modalities Rationale: decrease pain and increase tissue extensibility to improve patient's ability to perform ADLs. min [] Estim, type:                                          []  att     []  unatt     []  w/US     []  w/ice    []  w/heat    min []  Mechanical Traction: type/lbs                                               []  pro   []  sup   []  int   []  cont    min []  Ultrasound, settings/location:      min []  Iontophoresis:  []  take home patch w/ dexamethazone    min                                []  in clinic w/ dexamethazone   10 min []  Ice     [x]  Heat     position: Supine with wedge    min []  Other:      32 min Therapeutic Exercise:  [x]  See flow sheet   Rationale: To increase ROM, flexibility, and increase strength to improve the patients ability to perform household chores.      9 min Manual Therapy: Prone STM to DACIA L/S paraspinals and trigger point release to R piriformis   Rationale:      decrease pain, increase ROM, increase tissue extensibility and decrease trigger points to improve patient's ability to tolerate prolonged ambulation. min Patient Education:  YES  Reviewed HEP   []  Progressed/Changed HEP based on: Other Objective/Functional Measures:    See DC. Post Treatment Pain Level (on 0 to 10) scale:   0.5  / 10     ASSESSMENT  Assessment/Changes in Function:     See DC.     []  See Progress Note/Recertification   Patient will continue to benefit from skilled PT services to modify and progress therapeutic interventions, address functional mobility deficits, address ROM deficits, address strength deficits, analyze and address soft tissue restrictions, analyze and cue movement patterns, analyze and modify body mechanics/ergonomics, assess and modify postural abnormalities, address imbalance/dizziness and instruct in home and community integration to attain remaining goals. Progress toward goals / Updated goals:    See DC     PLAN  []  Upgrade activities as tolerated YES Continue plan of care   [x]  Discharge due to : Met or progressing towards all goals.    []  Other:      Therapist: Romulo Kulkarni    Date: 12/17/2018 Time: 1:55 PM     Future Appointments   Date Time Provider Liliana Parks   12/17/2018  4:30 PM Nidhi [facility]

## 2021-05-26 NOTE — PROGRESS NOTES
[address]   PHYSICAL THERAPY AT [facility], [address], [address] - Phone: [phone]  Fax: [phone] / [facility]  Patient Name: Edith Pagan : 1955   Medical   Diagnosis: Low back pain [M54.5] Treatment Diagnosis: Low back pain [M54.5]   Onset Date:      Referral Source: Anisa Mcclelland,  Start of Care [facility]): 10/25/2018   Prior Hospitalization: See medical history Provider #: [id number]   Prior Level of Function: Unrestricted and pain free ambulation    Comorbidities: Arthritis, BMI >30, and DM   Medications: Verified on Patient Summary List   The Plan of Care and following information is based on the information from the initial evaluation.   ==================================================================================  Assessment / key information: Patient is a 70 y.o. female who presents to [facility] with diagnosis of Low back pain [M54.5]. Patient reports chronic hx of LBP which progressively worsened 5 years ago with insidious onset. MRI of the L/S revealed spinal stenosis. Pain is located on DACIA sides of the lower back and radiates down DACIA LEs (below the knee) and is described as an intermittent ache. Pt denies numbness and tingling radiating down the DACIA LEs. Pain level is rated at 0/10 at the best, 1-2/10 currently, and 6/10 at the worst. LBP increases with carrying groceries, ambulation without shopping cart, transferring groceries into the car, lifting (>3 lbs), ambulation (~3 minutes), cooking, and reaching to the side, and decreases with sitting.  Upon objective evaluation, patient demonstrates impaired and painful trunk AROM in all directions except R rotation, decreased lumbar lordosis, DACIA LE sensation WNLs, decreased core and multifidus strength, decreased R LE strength in extensor hallicus, gluteus medius, and gleutus karri Clear muscles, and impaired flexibility of DACIA piriformis and left hamstring musculature. L/S AROM is as follows: Flexion = 85%, Extension = 50%, R/L Side glide = 75%/85%, R/L Side bending = 85%/85%, and R/L Rot = 100%/100%. All L/S special tests were cleared and negative. While screening red flags and reviewing systems patient reported DACIA LE weakness with ambulation and change in bowel movements (constipation). Demonstrated absent achilles DTR DACIA. Patient scored 45/100 on FOTO indicating decreased functional status and quality of life. Patient can benefit from skilled PT interventions to improve L/S ROM, flexibility, core strength, decrease pain and TTP and for education on posture, body mechanics and lifting mechanics/transfers to facilitate ADL's & overall functional status/quality of life.    ==================================================================================  Problem List: pain affecting function, decrease ROM, decrease strength, edema affecting function, impaired gait/ balance, decrease ADL/ functional abilities, decrease activity tolerance, decrease flexibility/ joint mobility and decrease transfer abilities   Treatment Plan may include any combination of the following: Therapeutic exercise, Therapeutic activities, Neuromuscular re-education, Physical agent/modality, dry needling, Gait/balance training, Manual therapy and Patient education  Patient / Family readiness to learn indicated by: asking questions, trying to perform skills and interest  Persons(s) to be included in education: patient (P)  Barriers to Learning/Limitations: no  Measures taken:    Patient Goal (s): \"walking longer distances, aerobic exercise\"   Patient self reported health status: fair  Rehabilitation Potential: good   Short Term Goals: To be accomplished in 3  weeks:  1) Establish HEP.     2) Patient will report decreased c/o pain to < or = 5/10 at the worst to facilitate prolonged standing with manageable sx in L/S.  3) Patient will report 25% improvement in ability to transfer groceries from the shopping cart into car. 4) Patient will report 25% improvement in DACIA LE radicular symptoms in order to facilitate ease with prolonged sitting.  Long Term Goals: To be accomplished in 6 weeks:  1) Patient independent with HEP and able to demo proper body mechanics for floor to chest lifting. 2) Patient will increase L/S ROM in all directions to pain free and >/=75% to increase ability to perform household chores. 3) Increase FOTO to 60/100 indicating improved function and quality of life. 4) Patient to perform 75% bridge indicating improved core strength to improve ambulation around the grocery store. 5) Patient to increase walking tolerance to >/=10 minutes in order to improve ease with grocery shopping. Frequency / Duration:   Patient to be seen  2  times per week for 6  weeks:  Patient / Caregiver education and instruction: self care, activity modification and exercises  G-Codes (GP): n/a  Eval Complexity: History: MEDIUM  Complexity : 1-2 comorbidities / personal factors will impact the outcome/ POC Exam:HIGH Complexity : 4+ Standardized tests and measures addressing body structure, function, activity limitation and / or participation in recreation  Presentation: MEDIUM Complexity : Evolving with changing characteristics  Clinical Decision Making:MEDIUM Complexity : FOTO score of 26-74Overall Complexity:MEDIUM    Therapist Signature: Birdia Fleischer Date: 98/07/8813   Certification Period: n/a Time: 3:26 PM   ==================================================================================  I certify that the above Physical Therapy Services are being furnished while the patient is under my care. I agree with the treatment plan and certify that this therapy is necessary. Physician Signature:        Date:       Time:     Please sign and return to [facility] or you may fax the signed copy to [phone]. Thank you.